# Patient Record
Sex: MALE | Race: BLACK OR AFRICAN AMERICAN | NOT HISPANIC OR LATINO | URBAN - METROPOLITAN AREA
[De-identification: names, ages, dates, MRNs, and addresses within clinical notes are randomized per-mention and may not be internally consistent; named-entity substitution may affect disease eponyms.]

---

## 2024-10-28 ENCOUNTER — OFFICE VISIT (OUTPATIENT)
Dept: URBAN - METROPOLITAN AREA CLINIC 107 | Facility: CLINIC | Age: 39
End: 2024-10-28
Payer: COMMERCIAL

## 2024-10-28 ENCOUNTER — DASHBOARD ENCOUNTERS (OUTPATIENT)
Age: 39
End: 2024-10-28

## 2024-10-28 VITALS
HEART RATE: 68 BPM | SYSTOLIC BLOOD PRESSURE: 124 MMHG | HEIGHT: 68 IN | WEIGHT: 236.2 LBS | TEMPERATURE: 97.9 F | BODY MASS INDEX: 35.8 KG/M2 | DIASTOLIC BLOOD PRESSURE: 72 MMHG

## 2024-10-28 DIAGNOSIS — R14.0 BLOATING: ICD-10-CM

## 2024-10-28 DIAGNOSIS — K59.09 CHRONIC CONSTIPATION: ICD-10-CM

## 2024-10-28 PROCEDURE — 99203 OFFICE O/P NEW LOW 30 MIN: CPT | Performed by: NURSE PRACTITIONER

## 2024-10-28 NOTE — HPI-TODAY'S VISIT:
39-year-old gentleman presenting for evaluation of abdominal discomfort, early satiety and acid reflux.  He tells me that he has been having lower abdominal pain with bloating, typically occurring after meals. Pain is difficult to describe as it is not sharp. He can get some relief with passing gas or bowel movements. He  feels full and distended after meals, even if it is something small. He has bowel movements daily without blood per rectum. There is no tenesmus. There is no mucus per rectum. He tells me that his symptoms were previously more severe, and have improved with eating a  diet, exercising more and increasing his water intake. There is not nausea or vomiting. No melena. His weight is stable; he was able to lose 7 or 8 pounds over the last month with exercise. There is no heartburn or trouble with swallowing.   Regarding dietary modification, he is eating higher protein, and lower carbohydrates. He is increasing his water intake. He is eating a carnivore diet with some intermittent fasting.

## 2024-11-08 ENCOUNTER — OFFICE VISIT (OUTPATIENT)
Dept: URBAN - METROPOLITAN AREA CLINIC 113 | Facility: CLINIC | Age: 39
End: 2024-11-08

## 2025-01-27 ENCOUNTER — OFFICE VISIT (OUTPATIENT)
Dept: URBAN - METROPOLITAN AREA CLINIC 107 | Facility: CLINIC | Age: 40
End: 2025-01-27

## 2025-01-30 ENCOUNTER — OFFICE VISIT (OUTPATIENT)
Dept: URBAN - METROPOLITAN AREA CLINIC 113 | Facility: CLINIC | Age: 40
End: 2025-01-30

## 2025-02-24 ENCOUNTER — OFFICE VISIT (OUTPATIENT)
Dept: URBAN - METROPOLITAN AREA CLINIC 107 | Facility: CLINIC | Age: 40
End: 2025-02-24
Payer: COMMERCIAL

## 2025-02-24 VITALS
TEMPERATURE: 97.8 F | BODY MASS INDEX: 34.86 KG/M2 | DIASTOLIC BLOOD PRESSURE: 70 MMHG | SYSTOLIC BLOOD PRESSURE: 132 MMHG | HEART RATE: 68 BPM | WEIGHT: 230 LBS | HEIGHT: 68 IN

## 2025-02-24 DIAGNOSIS — K59.09 CHRONIC CONSTIPATION: ICD-10-CM

## 2025-02-24 DIAGNOSIS — R14.0 BLOATING: ICD-10-CM

## 2025-02-24 PROCEDURE — 99213 OFFICE O/P EST LOW 20 MIN: CPT | Performed by: NURSE PRACTITIONER

## 2025-02-24 NOTE — HPI-TODAY'S VISIT:
39-year-old gentleman with constipation and bloating occurring most likely on the basis of dietary modification with higher protein, low carbohydrate diet.  He describes some fullness with overeating.  He was recommended bowel regimen with MiraLAX or magnesium to help combat constipation.  An antispasmodic was a consideration.  He has been less strict on his dietary restrictions. His constipation is improved on its own. There is no abdominal pain, nausea or vomiting. He has bowel movements most every day without blood per rectum.